# Patient Record
Sex: MALE | NOT HISPANIC OR LATINO | URBAN - METROPOLITAN AREA
[De-identification: names, ages, dates, MRNs, and addresses within clinical notes are randomized per-mention and may not be internally consistent; named-entity substitution may affect disease eponyms.]

---

## 2018-07-02 ENCOUNTER — OFFICE VISIT (OUTPATIENT)
Dept: URBAN - METROPOLITAN AREA CLINIC 32 | Facility: CLINIC | Age: 83
End: 2018-07-02
Payer: COMMERCIAL

## 2018-07-02 DIAGNOSIS — H04.123 DRY EYE SYNDROME OF BILATERAL LACRIMAL GLANDS: ICD-10-CM

## 2018-07-02 PROCEDURE — 92134 CPTRZ OPH DX IMG PST SGM RTA: CPT | Performed by: OPHTHALMOLOGY

## 2018-07-02 PROCEDURE — 99213 OFFICE O/P EST LOW 20 MIN: CPT | Performed by: OPHTHALMOLOGY

## 2018-07-02 ASSESSMENT — INTRAOCULAR PRESSURE
OS: 10
OD: 10

## 2018-07-02 NOTE — IMPRESSION/PLAN
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 OD. Condition: stable. last AV OD 01/30/2017. Plan: Discussed diagnosis in detail with patient. No treatment is required at this time based on exam and OCT. Recommend close observation for now. Will reassess condition in 4 - 6wks.   OCT shows no active leakage

## 2018-07-02 NOTE — IMPRESSION/PLAN
Impression: Diagnosis: Dry eye syndrome of bilateral lacrimal glands, OU. Code: W91.981. Side: OU. Plan: Dry eyes account for the patient's complaints. There is no evidence of permanent changes to the cornea. Explained condition does not have a cure and will need artificial tears for maintenance.

## 2018-07-02 NOTE — IMPRESSION/PLAN
Impression: Exdtve age-rel mclr degn, left eye, with actv chrdl neovas: H35.3221. OS. Condition: stable. Vision: vision affected. Last AV OS 05/07/2018, 03/26/2018. Plan: Discussed diagnosis in detail with patient. No treatment is required at this time based on exam and OCT. Recommend observation for now. Will reassess condition in 6 wks.  OCT shows no active leakage

## 2018-08-27 ENCOUNTER — OFFICE VISIT (OUTPATIENT)
Dept: URBAN - METROPOLITAN AREA CLINIC 32 | Facility: CLINIC | Age: 83
End: 2018-08-27
Payer: COMMERCIAL

## 2018-08-27 PROCEDURE — 99213 OFFICE O/P EST LOW 20 MIN: CPT | Performed by: OPHTHALMOLOGY

## 2018-08-27 PROCEDURE — 67028 INJECTION EYE DRUG: CPT | Performed by: OPHTHALMOLOGY

## 2018-08-27 PROCEDURE — 92134 CPTRZ OPH DX IMG PST SGM RTA: CPT | Performed by: OPHTHALMOLOGY

## 2018-08-27 NOTE — IMPRESSION/PLAN
Impression: Exdtve age-rel mclr degn, left eye, with actv chrdl neovas: H35.3221. OS. Condition: unstable. Vision: vision affected. Last AV OS 05/07/2018, 03/26/2018. Plan: Discussed diagnosis in detail with patient. Discussed risks of progression. Based on today's exam, diagnostic studies and review of records, recommend to continue with RAIZA tx of AVASTIN in the Left eye to help reduce the fluid in order to prevent a further reduction in vision. An examination that was significantly and separately identifiable from the procedure was performed today. Discussed the risks and benefits of tx. Patient elects to proceed with recommendation.  OCT shows minimal leakage OS

## 2018-08-27 NOTE — IMPRESSION/PLAN
Impression: Diagnosis: Dry eye syndrome of bilateral lacrimal glands, OU. Code: D85.627. Side: OU. Plan: Dry eyes account for the patient's complaints. There is no evidence of permanent changes to the cornea. Explained condition does not have a cure and will need artificial tears for maintenance.

## 2018-10-01 ENCOUNTER — OFFICE VISIT (OUTPATIENT)
Dept: URBAN - METROPOLITAN AREA CLINIC 32 | Facility: CLINIC | Age: 83
End: 2018-10-01
Payer: COMMERCIAL

## 2018-10-01 PROCEDURE — 92134 CPTRZ OPH DX IMG PST SGM RTA: CPT | Performed by: OPHTHALMOLOGY

## 2018-10-01 PROCEDURE — 99213 OFFICE O/P EST LOW 20 MIN: CPT | Performed by: OPHTHALMOLOGY

## 2018-10-01 ASSESSMENT — INTRAOCULAR PRESSURE
OD: 15
OS: 16

## 2018-10-01 NOTE — IMPRESSION/PLAN
Impression: Diagnosis: Dry eye syndrome of bilateral lacrimal glands, OU. Code: R37.110. Side: OU. Plan: Dry eyes account for the patient's complaints. There is no evidence of permanent changes to the cornea. Explained condition does not have a cure and will need artificial tears for maintenance.

## 2018-10-01 NOTE — IMPRESSION/PLAN
Impression: Exdtve age-rel mclr degn, left eye, with actv chrdl neovas: H35.3221. OS. Condition: unstable. Vision: vision affected. Last AV OS 08/27/18 Plan: Discussed diagnosis in detail with patient. No treatment is required at this time based on exam and OCT. Recommend close observation for now. Will reassess condition in 1 month.  OCT shows marked decrease in fluid

## 2018-11-05 ENCOUNTER — OFFICE VISIT (OUTPATIENT)
Dept: URBAN - METROPOLITAN AREA CLINIC 32 | Facility: CLINIC | Age: 83
End: 2018-11-05
Payer: COMMERCIAL

## 2018-11-05 PROCEDURE — 99213 OFFICE O/P EST LOW 20 MIN: CPT | Performed by: OPHTHALMOLOGY

## 2018-11-05 PROCEDURE — 67028 INJECTION EYE DRUG: CPT | Performed by: OPHTHALMOLOGY

## 2018-11-05 PROCEDURE — 92134 CPTRZ OPH DX IMG PST SGM RTA: CPT | Performed by: OPHTHALMOLOGY

## 2018-11-05 ASSESSMENT — INTRAOCULAR PRESSURE
OD: 15
OS: 15

## 2018-11-05 NOTE — IMPRESSION/PLAN
Impression: Dry eye syndrome of bilateral lacrimal glands, OU. Code: K68.659. Side: OU. Plan: Dry eyes account for the patient's complaints. There is no evidence of permanent changes to the cornea. Explained condition does not have a cure and will need artificial tears for maintenance.

## 2018-11-05 NOTE — IMPRESSION/PLAN
Impression: Exdtve age-rel mclr degn, left eye, with actv chrdl neovas: H35.3221. OS. Condition: unstable. Vision: vision affected. Last AV OS 08/27/18 Plan: Discussed diagnosis in detail with patient. Discussed risks of progression. Based on today's exam, diagnostic studies and review of records, recommend to restart RAIZA tx to help reduce the fluid in order to prevent a further reduction in vision. An examination that was significantly and separately identifiable from the procedure was performed today. Discussed the risks and benefits of tx. Patient elects to proceed with recommendation. OCT shows an increase in CMT - active fluid OS.

## 2018-11-05 NOTE — IMPRESSION/PLAN
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 OD. Condition: stable. Vision: vision affected. last AV OD 01/30/2017. Plan: Discussed diagnosis in detail with patient. No treatment is required at this time based on exam and OCT. Recommend observation for now. Will reassess condition in 4 - 6wks. OCT shows no active leakage OD.

## 2018-12-03 ENCOUNTER — OFFICE VISIT (OUTPATIENT)
Dept: URBAN - METROPOLITAN AREA CLINIC 32 | Facility: CLINIC | Age: 83
End: 2018-12-03
Payer: COMMERCIAL

## 2018-12-03 PROCEDURE — 92134 CPTRZ OPH DX IMG PST SGM RTA: CPT | Performed by: OPHTHALMOLOGY

## 2018-12-03 PROCEDURE — 99213 OFFICE O/P EST LOW 20 MIN: CPT | Performed by: OPHTHALMOLOGY

## 2018-12-03 ASSESSMENT — INTRAOCULAR PRESSURE
OD: 15
OS: 15

## 2018-12-03 NOTE — IMPRESSION/PLAN
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 OD. Condition: stable. Vision: vision affected. last AV OD 01/30/2017. Plan: Discussed diagnosis in detail with patient. No treatment is required at this time based on exam and OCT. Recommend observation for now. Will reassess condition in  6wks. OCT shows no active leakage OD.

## 2018-12-03 NOTE — IMPRESSION/PLAN
Impression: Exdtve age-rel mclr degn, left eye, with actv chrdl neovas: H35.3221. OS. Condition: stable. Vision: vision affected. Last AV OS 11/05/2018, AV OS  08/27/18. .... Plan: Discussed diagnosis in detail with patient. No treatment is required at this time based on exam and OCT. Recommend observation for now. Will reassess condition in 6 wks. OCT shows a decrease in CMT with no IRF or SRF OS.

## 2019-01-14 ENCOUNTER — OFFICE VISIT (OUTPATIENT)
Dept: URBAN - METROPOLITAN AREA CLINIC 32 | Facility: CLINIC | Age: 84
End: 2019-01-14
Payer: COMMERCIAL

## 2019-01-14 PROCEDURE — 99213 OFFICE O/P EST LOW 20 MIN: CPT | Performed by: OPHTHALMOLOGY

## 2019-01-14 PROCEDURE — 92134 CPTRZ OPH DX IMG PST SGM RTA: CPT | Performed by: OPHTHALMOLOGY

## 2019-01-14 ASSESSMENT — INTRAOCULAR PRESSURE
OS: 16
OD: 15

## 2019-01-14 NOTE — IMPRESSION/PLAN
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 OD. Condition: stable. Vision: vision affected. last AV OD 01/30/2017. Plan: Discussed diagnosis in detail with patient. No treatment is required at this time based on exam and OCT. Recommend observation for now. Will reassess condition in  6 wks. OCT shows no active leakage OD.

## 2019-01-14 NOTE — IMPRESSION/PLAN
Impression: Exdtve age-rel mclr degn, left eye, with actv chrdl neovas: H35.3221. OS. Condition: stable. Vision: vision affected. Last AV OS 11/05/2018, AV OS  08/27/18. .... Plan: Discussed diagnosis in detail with patient. No treatment is required at this time based on exam and OCT. Recommend observation for now. Will reassess condition in 6 wks. OCT shows no IRF or SRF OS.

## 2019-02-25 ENCOUNTER — OFFICE VISIT (OUTPATIENT)
Dept: URBAN - METROPOLITAN AREA CLINIC 33 | Facility: CLINIC | Age: 84
End: 2019-02-25
Payer: COMMERCIAL

## 2019-02-25 DIAGNOSIS — H35.3212 EXDTVE AGE-REL MCLR DEGN, RIGHT EYE, WITH INACT CHRDL NEOVAS: ICD-10-CM

## 2019-02-25 DIAGNOSIS — H35.3221 EXDTVE AGE-REL MCLR DEGN, LEFT EYE, WITH ACTV CHRDL NEOVAS: Primary | ICD-10-CM

## 2019-02-25 PROCEDURE — 92134 CPTRZ OPH DX IMG PST SGM RTA: CPT | Performed by: OPHTHALMOLOGY

## 2019-02-25 PROCEDURE — 99213 OFFICE O/P EST LOW 20 MIN: CPT | Performed by: OPHTHALMOLOGY

## 2019-02-25 ASSESSMENT — INTRAOCULAR PRESSURE
OD: 16
OS: 16

## 2019-02-25 NOTE — IMPRESSION/PLAN
Impression: Exdtve age-rel mclr degn, left eye, with actv chrdl neovas: H35.3221. OS. Condition: stable. Vision: vision affected. Last AV OS 11/05/2018, AV OS  08/27/18. .... Plan: Discussed diagnosis in detail with patient. No treatment is required at this time based on exam and OCT. Recommend observation for now. Will reassess condition in 6 wks.  OCT shows decrease central macula thickness OU

## 2019-02-25 NOTE — IMPRESSION/PLAN
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 OD. Condition: stable. Vision: vision affected. last AV OD 01/30/2017. Plan: Discussed diagnosis in detail with patient. No treatment is required at this time based on exam and OCT. Recommend observation for now. Will reassess condition in  6 wks.   OCT shows decrease central macula thickness OU

## 2019-03-05 ENCOUNTER — OFFICE VISIT (OUTPATIENT)
Dept: URBAN - METROPOLITAN AREA CLINIC 33 | Facility: CLINIC | Age: 84
End: 2019-03-05
Payer: COMMERCIAL

## 2019-03-05 DIAGNOSIS — H52.4 PRESBYOPIA: Primary | ICD-10-CM

## 2019-03-05 PROCEDURE — 92015 DETERMINE REFRACTIVE STATE: CPT | Performed by: OPTOMETRIST

## 2019-03-05 PROCEDURE — 92014 COMPRE OPH EXAM EST PT 1/>: CPT | Performed by: OPTOMETRIST

## 2019-03-05 ASSESSMENT — VISUAL ACUITY
OD: 20/150
OS: 20/400

## 2019-03-05 NOTE — IMPRESSION/PLAN
Impression: Presbyopia: H52.4. Plan: Educated patient about today's findings. Order refraction to determine patient's best corrected visual acuity as it relates to presbyopia- dispensed Rx to patient. Patient was educated about 1-2 week adaptation period with new Rx. Patient does not qualify for driving .

## 2019-04-08 ENCOUNTER — OFFICE VISIT (OUTPATIENT)
Dept: URBAN - METROPOLITAN AREA CLINIC 33 | Facility: CLINIC | Age: 84
End: 2019-04-08
Payer: COMMERCIAL

## 2019-04-08 PROCEDURE — 92134 CPTRZ OPH DX IMG PST SGM RTA: CPT | Performed by: OPHTHALMOLOGY

## 2019-04-08 PROCEDURE — 99213 OFFICE O/P EST LOW 20 MIN: CPT | Performed by: OPHTHALMOLOGY

## 2019-04-08 ASSESSMENT — INTRAOCULAR PRESSURE
OD: 14
OS: 15

## 2019-04-08 NOTE — IMPRESSION/PLAN
Impression: Exdtve age-rel mclr degn, left eye, with actv chrdl neovas: H35.3221. OS. Condition: stable. Vision: vision affected. Last AV OS 11/05/2018, AV OS  08/27/18. .... Plan: Discussed diagnosis in detail with patient. No treatment is required at this time based on exam and OCT. Recommend observation for now. Will reassess condition in 6 wks.  OCT shows Stable no significant change minimal cystic change OU

## 2019-04-08 NOTE — IMPRESSION/PLAN
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 OD. Condition: stable. Vision: vision affected. last AV OD 01/30/2017. Plan: Discussed diagnosis in detail with patient. No treatment is required at this time based on exam and OCT. Recommend observation for now. Will reassess condition in  6 wks.   OCT shows Stable no significant change minimal cystic change OU

## 2019-05-02 ENCOUNTER — OFFICE VISIT (OUTPATIENT)
Dept: URBAN - METROPOLITAN AREA CLINIC 33 | Facility: CLINIC | Age: 84
End: 2019-05-02
Payer: COMMERCIAL

## 2019-05-02 PROCEDURE — 92134 CPTRZ OPH DX IMG PST SGM RTA: CPT | Performed by: OPHTHALMOLOGY

## 2019-05-02 PROCEDURE — 99213 OFFICE O/P EST LOW 20 MIN: CPT | Performed by: OPHTHALMOLOGY

## 2019-05-02 ASSESSMENT — INTRAOCULAR PRESSURE
OS: 15
OD: 15

## 2019-05-02 NOTE — IMPRESSION/PLAN
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 OU. Condition: worsening. Vision: vision affected. last AV OD 01/30/2017, AV OD 11/21/2016. Last AV OS 11/05/2018, AV OS  08/27/18, 05/07/18. Plan: Discussed diagnosis in detail with patient. Discussed risks of progression. Based on today's exam, diagnostic studies and review of records, recommend to continue with RAIZA tx to help reduce the fluid in order to prevent a further reduction in vision. An examination that was significantly and separately identifiable from the procedure was performed today. Discussed the risks and benefits of tx. Patient elects to proceed with recommendation. OCT shows an increase in CMT OU w/increase PED OS.

## 2019-06-17 ENCOUNTER — OFFICE VISIT (OUTPATIENT)
Dept: URBAN - METROPOLITAN AREA CLINIC 33 | Facility: CLINIC | Age: 84
End: 2019-06-17
Payer: COMMERCIAL

## 2019-06-17 PROCEDURE — 92134 CPTRZ OPH DX IMG PST SGM RTA: CPT | Performed by: OPHTHALMOLOGY

## 2019-06-17 PROCEDURE — 99213 OFFICE O/P EST LOW 20 MIN: CPT | Performed by: OPHTHALMOLOGY

## 2019-06-17 ASSESSMENT — INTRAOCULAR PRESSURE
OD: 14
OS: 14

## 2019-06-17 NOTE — IMPRESSION/PLAN
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 OU. Condition: stabilizing. Vision: vision affected. s/p AV OU 05/02/2019
last AV OD 01/30/2017, AV OD 11/21/2016. Last AV OS 11/05/2018, AV OS  08/27/18, 05/07/18. Plan: Discussed diagnosis in detail with patient. Exam shows no obvious fluid OU although OCT OD shows a minimal increase in CMT and OS shows PED with IRF and decrease SRF. No treatment is required at this time based on exam and OCT. Recommend observation for now. Will reassess condition in 6 wks.  OCT shows no IRF or SRF - stable

## 2019-07-29 ENCOUNTER — OFFICE VISIT (OUTPATIENT)
Dept: URBAN - METROPOLITAN AREA CLINIC 33 | Facility: CLINIC | Age: 84
End: 2019-07-29
Payer: COMMERCIAL

## 2019-07-29 PROCEDURE — 92134 CPTRZ OPH DX IMG PST SGM RTA: CPT | Performed by: OPHTHALMOLOGY

## 2019-07-29 PROCEDURE — 99213 OFFICE O/P EST LOW 20 MIN: CPT | Performed by: OPHTHALMOLOGY

## 2019-07-29 ASSESSMENT — INTRAOCULAR PRESSURE
OS: 16
OD: 17

## 2019-07-29 NOTE — IMPRESSION/PLAN
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 OU. Condition: stable OU. Vision: vision affected. s/p AV OU 05/02/2019
last AV OD 01/30/2017, AV OD 11/21/2016. Last AV OS 11/05/2018, AV OS  08/27/18, 05/07/18. Plan: Discussed diagnosis in detail with patient. No treatment is required at this time based on exam and OCT. Recommend observation for now. Will reassess condition in 6 wks. OCT shows Cystic changes OU with decrease in CMT OS.

## 2019-09-09 ENCOUNTER — OFFICE VISIT (OUTPATIENT)
Dept: URBAN - METROPOLITAN AREA CLINIC 33 | Facility: CLINIC | Age: 84
End: 2019-09-09
Payer: COMMERCIAL

## 2019-09-09 PROCEDURE — 67028 INJECTION EYE DRUG: CPT | Performed by: OPHTHALMOLOGY

## 2019-09-09 PROCEDURE — 92134 CPTRZ OPH DX IMG PST SGM RTA: CPT | Performed by: OPHTHALMOLOGY

## 2019-09-09 PROCEDURE — 99213 OFFICE O/P EST LOW 20 MIN: CPT | Performed by: OPHTHALMOLOGY

## 2019-09-09 ASSESSMENT — INTRAOCULAR PRESSURE
OD: 15
OS: 14

## 2019-09-09 NOTE — IMPRESSION/PLAN
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 OU. Condition: stable OD, worsening OS. Vision: vision affected. s/p AV OU 05/02/2019, AV OD 01/30/2017, AV OD 11/21/2016. previous AV OS 11/05/2018, AV OS  08/27/18, 05/07/18. Plan: Discussed diagnosis in detail with patient. Discussed risks of progression. Based on today's exam, diagnostic studies and review of records, recommend to continue with RAIZA tx LEFT EYE to help reduce the fluid in order to prevent a further reduction in vision. An examination that was significantly and separately identifiable from the procedure was performed today. Discussed the risks and benefits of tx. Patient elects to proceed with recommendation. OCT shows increase in IRF OS. Exam and OCT OD show no active fluid or heme - stable. Will continue to monitor.

## 2019-10-21 ENCOUNTER — OFFICE VISIT (OUTPATIENT)
Dept: URBAN - METROPOLITAN AREA CLINIC 33 | Facility: CLINIC | Age: 84
End: 2019-10-21
Payer: COMMERCIAL

## 2019-10-21 DIAGNOSIS — H35.3231 EXUDATIVE AGE-REL MCLR DEGN, BI, WITH ACTV CHRDL NEOVAS: Primary | ICD-10-CM

## 2019-10-21 PROCEDURE — 92134 CPTRZ OPH DX IMG PST SGM RTA: CPT | Performed by: OPHTHALMOLOGY

## 2019-10-21 PROCEDURE — 99213 OFFICE O/P EST LOW 20 MIN: CPT | Performed by: OPHTHALMOLOGY

## 2019-10-21 ASSESSMENT — INTRAOCULAR PRESSURE
OD: 14
OS: 14

## 2019-10-21 NOTE — IMPRESSION/PLAN
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 OU. Condition: stable OD, improved OS. Vision: vision affected. s/p AV OS 09/09/2019, AV OU 05/02/2019, AV OD 01/30/2017, AV OD 11/21/2016. previous AV OS 11/05/2018, AV OS  08/27/18, 05/07/18. Plan: Discussed diagnosis in detail with patient. No treatment is required at this time based on exam and OCT. Recommend observation for now. Will reassess condition in 4 - 6 wks. OCT shows no IRF or SRF - stable OU.

## 2019-11-18 ENCOUNTER — OFFICE VISIT (OUTPATIENT)
Dept: URBAN - METROPOLITAN AREA CLINIC 33 | Facility: CLINIC | Age: 84
End: 2019-11-18
Payer: COMMERCIAL

## 2019-11-18 PROCEDURE — 99213 OFFICE O/P EST LOW 20 MIN: CPT | Performed by: OPHTHALMOLOGY

## 2019-11-18 PROCEDURE — 92134 CPTRZ OPH DX IMG PST SGM RTA: CPT | Performed by: OPHTHALMOLOGY

## 2019-11-18 PROCEDURE — 67028 INJECTION EYE DRUG: CPT | Performed by: OPHTHALMOLOGY

## 2019-11-18 ASSESSMENT — INTRAOCULAR PRESSURE
OD: 16
OS: 15

## 2019-11-18 NOTE — IMPRESSION/PLAN
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 OU. Condition: stable OD, unstable OS. Vision: vision affected. s/p AV OS 09/09/2019, AV OU 05/02/2019, AV OD 01/30/2017, AV OD 11/21/2016. previous AV OS 11/05/2018, AV OS  08/27/18, 05/07/18. Plan: Discussed diagnosis in detail with patient. Discussed risks of progression. Based on today's exam, diagnostic studies and review of records, recommend to continue with RAIZA tx AVASTIN LEFT EYE ONLY to help reduce the fluid in order to prevent a further reduction in vision. An examination that was significantly and separately identifiable from the procedure was performed today. Discussed the risks and benefits of tx. Patient elects to proceed with recommendation. OCT shows increase fluid OS Exam and OCT of the right eye shows no active hemorrhage or fluid today. Discussed diagnosis in detail with patient. No treatment is required at this time based on exam and OCT. Recommend observation for now. Will reassess condition in 6 wks.

## 2019-12-12 ENCOUNTER — OFFICE VISIT (OUTPATIENT)
Dept: URBAN - METROPOLITAN AREA CLINIC 33 | Facility: CLINIC | Age: 84
End: 2019-12-12
Payer: COMMERCIAL

## 2019-12-12 PROCEDURE — 92134 CPTRZ OPH DX IMG PST SGM RTA: CPT | Performed by: OPHTHALMOLOGY

## 2019-12-12 PROCEDURE — 99213 OFFICE O/P EST LOW 20 MIN: CPT | Performed by: OPHTHALMOLOGY

## 2019-12-12 ASSESSMENT — INTRAOCULAR PRESSURE
OS: 16
OD: 16

## 2019-12-12 NOTE — IMPRESSION/PLAN
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 OU. Condition: stable OU. Vision: vision affected. s/p AV OS 09/09/2019, AV OU 05/02/2019, AV OD 01/30/2017, AV OD 11/21/2016. previous AV OS 11/05/2018, AV OS  08/27/18, 05/07/18. Plan: Discussed diagnosis in detail with patient. No treatment is required at this time based on exam and OCT. Recommend observation for now. Will reassess condition in 6 wks.  OCT shows no IRF or SRF - stable OU

## 2020-01-23 ENCOUNTER — OFFICE VISIT (OUTPATIENT)
Dept: URBAN - METROPOLITAN AREA CLINIC 33 | Facility: CLINIC | Age: 85
End: 2020-01-23
Payer: COMMERCIAL

## 2020-01-23 PROCEDURE — 92134 CPTRZ OPH DX IMG PST SGM RTA: CPT | Performed by: OPHTHALMOLOGY

## 2020-01-23 PROCEDURE — 99213 OFFICE O/P EST LOW 20 MIN: CPT | Performed by: OPHTHALMOLOGY

## 2020-01-23 PROCEDURE — 67028 INJECTION EYE DRUG: CPT | Performed by: OPHTHALMOLOGY

## 2020-01-23 ASSESSMENT — INTRAOCULAR PRESSURE
OD: 16
OS: 16

## 2020-01-23 NOTE — IMPRESSION/PLAN
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 OD. Condition: stable. Vision: vision affected. s/p  AV OD 05/02/19, 01/30/2017, 11/21/2016 Plan: Discussed diagnosis in detail with patient. No treatment is required at this time based on exam and OCT. Recommend observation for now. Will reassess condition in 6 wks. OCT shows no IRF or SRF - stable OD.

## 2020-01-23 NOTE — IMPRESSION/PLAN
Impression: Exdtve age-rel mclr degn, left eye, with actv chrdl neovas: H35.3221. OS. Condition: unstable. Vision: vision affected. s/p AV OS 11/18/19, 09/09/2019, 05/02/2019, 11/05/2018. Plan: Discussed diagnosis in detail with patient. Discussed risks of progression. Based on today's exam, diagnostic studies and review of records, recommend to continue with RAIZA tx to help reduce the fluid in order to prevent a further reduction in vision. An examination that was significantly and separately identifiable from the procedure was performed today. Discussed the risks and benefits of tx. Patient elects to proceed with recommendation. OCT shows increase in CMT - mild fluid OS.

## 2020-03-05 ENCOUNTER — OFFICE VISIT (OUTPATIENT)
Dept: URBAN - METROPOLITAN AREA CLINIC 33 | Facility: CLINIC | Age: 85
End: 2020-03-05
Payer: COMMERCIAL

## 2020-03-05 PROCEDURE — 92134 CPTRZ OPH DX IMG PST SGM RTA: CPT | Performed by: OPHTHALMOLOGY

## 2020-03-05 PROCEDURE — 99213 OFFICE O/P EST LOW 20 MIN: CPT | Performed by: OPHTHALMOLOGY

## 2020-03-05 ASSESSMENT — INTRAOCULAR PRESSURE
OS: 14
OD: 15

## 2020-03-05 NOTE — IMPRESSION/PLAN
Impression: Exdtve age-rel mclr degn, left eye, with actv chrdl neovas: H35.3221. OS. Condition: improving. Vision: vision affected. s/p AV OS 01/23/2020, 11/18/19, 09/09/2019, 05/02/2019, 11/05/2018. Plan: Discussed diagnosis in detail with patient. No treatment is required at this time based on exam and OCT. Recommend observation for now. Will reassess condition in 6 wks. OCT shows a decrease in CMT w/ no IRF or SRF OS.

## 2021-07-22 ENCOUNTER — OFFICE VISIT (OUTPATIENT)
Dept: URBAN - METROPOLITAN AREA CLINIC 33 | Facility: CLINIC | Age: 86
End: 2021-07-22
Payer: COMMERCIAL

## 2021-07-22 PROCEDURE — 99213 OFFICE O/P EST LOW 20 MIN: CPT | Performed by: OPHTHALMOLOGY

## 2021-07-22 PROCEDURE — 92134 CPTRZ OPH DX IMG PST SGM RTA: CPT | Performed by: OPHTHALMOLOGY

## 2021-07-22 ASSESSMENT — INTRAOCULAR PRESSURE
OD: 17
OS: 17

## 2021-07-22 NOTE — IMPRESSION/PLAN
Impression: Exudative age-rel mclr degn, bilateral, with inactive scar: H35.3233. Bilateral. Condition: stable. Vision: vision affected. s/p  AV OD 05/02/19, 01/30/2017, 11/21/2016
s/p AV OS 01/23/2020, 11/18/19, 09/09/2019, 05/02/2019, 11/05/2018. Plan: Discussed diagnosis in detail with patient. Exam shows stable inactive scar OU with no fluid or heme. OCT shows a minimal increase in CMT OS >OD. No treatment is required at this time. Will continue to observe condition and or symptoms. Recommend a retina f/u in 2 mos. Consider LVAKBAR w/Dr Catherine More.

## 2021-09-28 ENCOUNTER — OFFICE VISIT (OUTPATIENT)
Dept: URBAN - METROPOLITAN AREA CLINIC 32 | Facility: CLINIC | Age: 86
End: 2021-09-28
Payer: COMMERCIAL

## 2021-09-28 PROCEDURE — 92014 COMPRE OPH EXAM EST PT 1/>: CPT | Performed by: OPTOMETRIST

## 2021-09-28 ASSESSMENT — KERATOMETRY
OD: 43.50
OS: 44.13

## 2021-09-28 ASSESSMENT — INTRAOCULAR PRESSURE
OS: 20
OD: 19

## 2021-09-28 ASSESSMENT — VISUAL ACUITY
OD: 20/350
OS: 20/350

## 2021-10-14 ENCOUNTER — OFFICE VISIT (OUTPATIENT)
Dept: URBAN - METROPOLITAN AREA CLINIC 33 | Facility: CLINIC | Age: 86
End: 2021-10-14
Payer: MEDICARE

## 2021-10-14 PROCEDURE — 92134 CPTRZ OPH DX IMG PST SGM RTA: CPT | Performed by: OPHTHALMOLOGY

## 2021-10-14 PROCEDURE — 99213 OFFICE O/P EST LOW 20 MIN: CPT | Performed by: OPHTHALMOLOGY

## 2021-10-14 ASSESSMENT — INTRAOCULAR PRESSURE
OD: 18
OS: 19

## 2021-10-14 NOTE — IMPRESSION/PLAN
Impression: Exudative age-rel mclr degn, bilateral, with inactive scar: H35.3233. Bilateral. Condition: stable. Vision: vision affected. s/p  AV OD 05/02/19, 01/30/2017, 11/21/2016
s/p AV OS 01/23/2020, 11/18/19, 09/09/2019, 05/02/2019, 11/05/2018. Plan: Discussed diagnosis in detail with patient. Exam OD shows disciform scar w/ small speck of heme centrally and Exam OS shows extensive disciform scar w/ fibrosis, no heme or fluid. OCT shows decreased fluid OU w/ scarring. No treatment is required at this time based on exam and diagnostic tests. Recommend observation for now. Will reassess condition in 6 wks, sooner if there is a sudden change in vision.

## 2022-01-20 ENCOUNTER — OFFICE VISIT (OUTPATIENT)
Dept: URBAN - METROPOLITAN AREA CLINIC 33 | Facility: CLINIC | Age: 87
End: 2022-01-20
Payer: MEDICARE

## 2022-01-20 PROCEDURE — 92134 CPTRZ OPH DX IMG PST SGM RTA: CPT | Performed by: OPHTHALMOLOGY

## 2022-01-20 PROCEDURE — 99213 OFFICE O/P EST LOW 20 MIN: CPT | Performed by: OPHTHALMOLOGY

## 2022-01-20 ASSESSMENT — INTRAOCULAR PRESSURE
OD: 16
OS: 16

## 2022-01-20 NOTE — IMPRESSION/PLAN
Impression: Exudative age-rel mclr degn, bilateral, with inactive scar: H35.3233. Bilateral. Condition: stable. Vision: vision affected. s/p  AV OD 05/02/19, 01/30/2017, 11/21/2016
s/p AV OS 01/23/2020, 11/18/19, 09/09/2019, 05/02/2019, 11/05/2018. Plan: Discussed diagnosis in detail with patient. Exam shows the macula is stable OU w/scarring OU. OCT shows scarring w/decreased fluid OU. No treatment is required at this time based on exam and diagnostic test. Recommend observation for now. Will reassess condition in 8 wks, sooner if there is a sudden change in vision.

## 2022-02-21 ENCOUNTER — OFFICE VISIT (OUTPATIENT)
Dept: URBAN - METROPOLITAN AREA CLINIC 33 | Facility: CLINIC | Age: 87
End: 2022-02-21
Payer: MEDICARE

## 2022-02-21 DIAGNOSIS — H35.3233 EXUDATIVE AGE-REL MCLR DEGN, BILATERAL, WITH INACTIVE SCAR: Primary | ICD-10-CM

## 2022-02-21 PROCEDURE — 92134 CPTRZ OPH DX IMG PST SGM RTA: CPT | Performed by: OPHTHALMOLOGY

## 2022-02-21 PROCEDURE — 99213 OFFICE O/P EST LOW 20 MIN: CPT | Performed by: OPHTHALMOLOGY

## 2022-02-21 ASSESSMENT — INTRAOCULAR PRESSURE
OS: 15
OD: 15

## 2022-02-21 NOTE — IMPRESSION/PLAN
Impression: Exudative age-rel mclr degn, bilateral, with inactive scar: H35.3233. Bilateral. Condition: stable. Vision: vision affected. s/p  AV OD 05/02/19, 01/30/2017, 11/21/2016
s/p AV OS 01/23/2020, 11/18/19, 09/09/2019, 05/02/2019, 11/05/2018. Plan: Discussed diagnosis in detail with patient. Exam shows the macula is stable OU w/scarring OU. OCT shows scarring - stable. No treatment is required at this time based on exam and diagnostic test. Recommend observation for now. Recommend artificial gel gtts QID. Will reassess condition in 4 mos, sooner if there is a sudden change in vision.

## 2022-06-23 NOTE — IMPRESSION/PLAN
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 Bilateral. Condition: worsening. Vision: vision affected. s/p AV OS 01/23/2020, AV OS 11/18/19, 9/19/19, AV OU 05/02/2019, AV OS 11/05/2018, AV OS  08/27/18, 05/07/18, AV OD 01/30/2017, AV OD 11/21/2016. Plan: Discussed diagnosis in detail with patient. Exam shows scarring, new mild fluid OU. Discussed risks of progression with present condition. Based on findings recommend Intravitreal Injection Treatment in BOTH EYES with AVASTIN to help reduce the fluid and prevent a further reduction in vision. Discussed the risks and benefits of tx. All questions answered. Patient elects to proceed with recommendation. OCT shows scarring w/increase in fluid OU.

## 2022-07-06 NOTE — IMPRESSION/PLAN
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 Bilateral. Condition: worsening. Vision: vision affected. s/p AV OS 01/23/2020, AV OS 11/18/19, 9/19/19, AV OU 05/02/2019, AV OS 11/05/2018, AV OS  08/27/18, 05/07/18, AV OD 01/30/2017, AV OD 11/21/2016. Plan: Discussed diagnosis in detail with patient. Exam shows scarring, new mild fluid OU. Discussed risks of progression with present condition.  Continue care with Retina Specialist

## 2022-07-06 NOTE — IMPRESSION/PLAN
Impression: Presbyopia: H52.4. Plan: Finalized new glasses Rx. Patient education on appropriate options of eye glasses. Return to clinic in 1 year for complete eye exam and refraction. gave high add bifocal RX.  Recommend ADL consider magnifiers if need

## 2022-07-21 NOTE — IMPRESSION/PLAN
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 Bilateral. Condition: improving OU. Vision: vision affected. s/p AV OU 06/23/22, AV OS 01/23/2020, AV OS 11/18/19, AV OS 09/19/19. .. Plan: Discussed diagnosis in detail with patient. No treatment is required at this time based on exam and diagnostic tests. Recommend close observation for now. Will reassess condition in 6 weeks. OCT OU shows scarring w/ reduced leakage.

## 2023-01-23 NOTE — IMPRESSION/PLAN
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 Bilateral. Condition: stable OU. Vision: vision affected. s/p AV OU 06/23/22, AV OS 01/23/2020, AV OS 11/18/19, AV OS 09/19/19. .. Plan: Discussed diagnosis in detail with patient. Exam shows scarring w/minimal activity - chronic OU. No treatment is required at this time. Recommend observation for now. Will reassess condition in 3 mos. OCT OU shows increased edema w/scarring OU and Optos shows stable Disciform Scar OU. Recommend a LVE w/Dr La Ruiz or refraction w/Dr Sergio Damon.

## 2023-01-30 NOTE — IMPRESSION/PLAN
Impression: Presbyopia: H52.4.
-trial framed new reading glasses today. Patient liked lenses Plan: Patient educated on all refractive findings. SRx was finalized and released to patient. Discussed adaptation period of at least 3 weeks of full time wear with patient. Continue to monitor.